# Patient Record
Sex: MALE | Race: WHITE | NOT HISPANIC OR LATINO
[De-identification: names, ages, dates, MRNs, and addresses within clinical notes are randomized per-mention and may not be internally consistent; named-entity substitution may affect disease eponyms.]

---

## 2021-10-29 ENCOUNTER — TRANSCRIPTION ENCOUNTER (OUTPATIENT)
Age: 56
End: 2021-10-29

## 2021-10-30 ENCOUNTER — TRANSCRIPTION ENCOUNTER (OUTPATIENT)
Age: 56
End: 2021-10-30

## 2021-10-30 ENCOUNTER — OUTPATIENT (OUTPATIENT)
Dept: INPATIENT UNIT | Facility: HOSPITAL | Age: 56
LOS: 1 days | End: 2021-10-30
Payer: COMMERCIAL

## 2021-10-30 ENCOUNTER — APPOINTMENT (OUTPATIENT)
Dept: DISASTER EMERGENCY | Facility: HOSPITAL | Age: 56
End: 2021-10-30

## 2021-10-30 VITALS
WEIGHT: 315 LBS | OXYGEN SATURATION: 96 % | HEIGHT: 74 IN | RESPIRATION RATE: 20 BRPM | SYSTOLIC BLOOD PRESSURE: 135 MMHG | TEMPERATURE: 99 F | HEART RATE: 57 BPM | DIASTOLIC BLOOD PRESSURE: 85 MMHG

## 2021-10-30 VITALS
OXYGEN SATURATION: 98 % | HEART RATE: 64 BPM | TEMPERATURE: 98 F | DIASTOLIC BLOOD PRESSURE: 95 MMHG | SYSTOLIC BLOOD PRESSURE: 159 MMHG | RESPIRATION RATE: 18 BRPM

## 2021-10-30 DIAGNOSIS — U07.1 COVID-19: ICD-10-CM

## 2021-10-30 PROBLEM — Z00.00 ENCOUNTER FOR PREVENTIVE HEALTH EXAMINATION: Status: ACTIVE | Noted: 2021-10-30

## 2021-10-30 PROCEDURE — M0243: CPT

## 2021-10-30 RX ORDER — SODIUM CHLORIDE 9 MG/ML
250 INJECTION INTRAMUSCULAR; INTRAVENOUS; SUBCUTANEOUS
Refills: 0 | Status: COMPLETED | OUTPATIENT
Start: 2021-10-30 | End: 2021-10-30

## 2021-10-30 RX ADMIN — SODIUM CHLORIDE 310 MILLILITER(S): 9 INJECTION INTRAMUSCULAR; INTRAVENOUS; SUBCUTANEOUS at 09:23

## 2021-10-30 NOTE — CHART NOTE - PRIOR COVID TREATMENT
history of morbid obesity.    I have reviewed the Regeneron Emergency Use Authorization (EUA) and I have provided the patient or patient's caregiver with the following information:    1.FDA has authorized emergency use Regeneron which is not an FDA-approved biological product.  2.The patient or patient's caregiver has the option to accept or refuse administration of Regeneron.  3.The significant known and potential risks and benefits of Regeneron and the extent to which such risks and benefits are unknown.  4.Information on available alternative treatments and risks and benefits of those alternatives.  5.Pt. verbalized understanding of plan and agrees with same.  6.All questions answered.

## 2021-10-31 ENCOUNTER — TRANSCRIPTION ENCOUNTER (OUTPATIENT)
Age: 56
End: 2021-10-31

## 2022-01-28 ENCOUNTER — OUTPATIENT (OUTPATIENT)
Dept: OUTPATIENT SERVICES | Facility: HOSPITAL | Age: 57
LOS: 1 days | End: 2022-01-28
Payer: COMMERCIAL

## 2022-01-28 VITALS — WEIGHT: 315 LBS | TEMPERATURE: 98 F | HEIGHT: 74 IN

## 2022-01-28 DIAGNOSIS — M25.561 PAIN IN RIGHT KNEE: ICD-10-CM

## 2022-01-28 DIAGNOSIS — Z01.818 ENCOUNTER FOR OTHER PREPROCEDURAL EXAMINATION: ICD-10-CM

## 2022-01-28 DIAGNOSIS — Z98.890 OTHER SPECIFIED POSTPROCEDURAL STATES: Chronic | ICD-10-CM

## 2022-01-28 DIAGNOSIS — S83.241A OTHER TEAR OF MEDIAL MENISCUS, CURRENT INJURY, RIGHT KNEE, INITIAL ENCOUNTER: ICD-10-CM

## 2022-01-28 LAB
ANION GAP SERPL CALC-SCNC: 3 MMOL/L — LOW (ref 5–17)
BASOPHILS # BLD AUTO: 0.04 K/UL — SIGNIFICANT CHANGE UP (ref 0–0.2)
BASOPHILS NFR BLD AUTO: 0.6 % — SIGNIFICANT CHANGE UP (ref 0–2)
BUN SERPL-MCNC: 19 MG/DL — SIGNIFICANT CHANGE UP (ref 7–23)
CALCIUM SERPL-MCNC: 9.1 MG/DL — SIGNIFICANT CHANGE UP (ref 8.5–10.1)
CHLORIDE SERPL-SCNC: 110 MMOL/L — HIGH (ref 96–108)
CO2 SERPL-SCNC: 26 MMOL/L — SIGNIFICANT CHANGE UP (ref 22–31)
CREAT SERPL-MCNC: 0.8 MG/DL — SIGNIFICANT CHANGE UP (ref 0.5–1.3)
EOSINOPHIL # BLD AUTO: 0.09 K/UL — SIGNIFICANT CHANGE UP (ref 0–0.5)
EOSINOPHIL NFR BLD AUTO: 1.2 % — SIGNIFICANT CHANGE UP (ref 0–6)
GLUCOSE SERPL-MCNC: 98 MG/DL — SIGNIFICANT CHANGE UP (ref 70–99)
HCT VFR BLD CALC: 44 % — SIGNIFICANT CHANGE UP (ref 39–50)
HGB BLD-MCNC: 14.7 G/DL — SIGNIFICANT CHANGE UP (ref 13–17)
IMM GRANULOCYTES NFR BLD AUTO: 0.4 % — SIGNIFICANT CHANGE UP (ref 0–1.5)
LYMPHOCYTES # BLD AUTO: 2.17 K/UL — SIGNIFICANT CHANGE UP (ref 1–3.3)
LYMPHOCYTES # BLD AUTO: 30.1 % — SIGNIFICANT CHANGE UP (ref 13–44)
MCHC RBC-ENTMCNC: 31.6 PG — SIGNIFICANT CHANGE UP (ref 27–34)
MCHC RBC-ENTMCNC: 33.4 GM/DL — SIGNIFICANT CHANGE UP (ref 32–36)
MCV RBC AUTO: 94.6 FL — SIGNIFICANT CHANGE UP (ref 80–100)
MONOCYTES # BLD AUTO: 0.38 K/UL — SIGNIFICANT CHANGE UP (ref 0–0.9)
MONOCYTES NFR BLD AUTO: 5.3 % — SIGNIFICANT CHANGE UP (ref 2–14)
NEUTROPHILS # BLD AUTO: 4.5 K/UL — SIGNIFICANT CHANGE UP (ref 1.8–7.4)
NEUTROPHILS NFR BLD AUTO: 62.4 % — SIGNIFICANT CHANGE UP (ref 43–77)
PLATELET # BLD AUTO: 190 K/UL — SIGNIFICANT CHANGE UP (ref 150–400)
POTASSIUM SERPL-MCNC: 4.3 MMOL/L — SIGNIFICANT CHANGE UP (ref 3.5–5.3)
POTASSIUM SERPL-SCNC: 4.3 MMOL/L — SIGNIFICANT CHANGE UP (ref 3.5–5.3)
RBC # BLD: 4.65 M/UL — SIGNIFICANT CHANGE UP (ref 4.2–5.8)
RBC # FLD: 12.8 % — SIGNIFICANT CHANGE UP (ref 10.3–14.5)
SODIUM SERPL-SCNC: 139 MMOL/L — SIGNIFICANT CHANGE UP (ref 135–145)
WBC # BLD: 7.21 K/UL — SIGNIFICANT CHANGE UP (ref 3.8–10.5)
WBC # FLD AUTO: 7.21 K/UL — SIGNIFICANT CHANGE UP (ref 3.8–10.5)

## 2022-01-28 PROCEDURE — G0463: CPT | Mod: 25

## 2022-01-28 PROCEDURE — 80048 BASIC METABOLIC PNL TOTAL CA: CPT

## 2022-01-28 PROCEDURE — 36415 COLL VENOUS BLD VENIPUNCTURE: CPT

## 2022-01-28 PROCEDURE — 85025 COMPLETE CBC W/AUTO DIFF WBC: CPT

## 2022-01-28 PROCEDURE — 93010 ELECTROCARDIOGRAM REPORT: CPT

## 2022-01-28 PROCEDURE — 93005 ELECTROCARDIOGRAM TRACING: CPT

## 2022-01-28 NOTE — H&P PST ADULT - HISTORY OF PRESENT ILLNESS
55 y/o male present to [facility] for schedule right knee arthroscopy on 2/4/22. Patient reports non radiating sharp pain to right knee associated with knee buckling s/p trip and fall 11/2021.

## 2022-01-28 NOTE — H&P PST ADULT - NSICDXFAMILYHX_GEN_ALL_CORE_FT
FAMILY HISTORY:  Father  Still living? Unknown  FH: pancreatic cancer, Age at diagnosis: Age Unknown

## 2022-01-28 NOTE — H&P PST ADULT - HEALTH CARE MAINTENANCE
no recent travels outside of the country or states within the last 14 days, no fever, URI, SOB or recent change /loss in smell or taste  no flu vaccine   covid19 hx s/s flu like only

## 2022-01-28 NOTE — H&P PST ADULT - NSANTHOSAYNRD_GEN_A_CORE
No. PARKER screening performed.  STOP BANG Legend: 0-2 = LOW Risk; 3-4 = INTERMEDIATE Risk; 5-8 = HIGH Risk

## 2022-01-28 NOTE — H&P PST ADULT - PRO ANTICIPATED DISCH DISP
Alert-The patient is alert, awake and responds to voice. The patient is oriented to time, place, and person. The triage nurse is able to obtain subjective information. home

## 2022-01-28 NOTE — H&P PST ADULT - ASSESSMENT
55 y/o male present to [facility] for schedule right knee arthroscopy on 2/4/22.

## 2022-01-28 NOTE — H&P PST ADULT - PROBLEM SELECTOR PLAN 1
Pre op and chlorhexidine instructions given and explained.  Avoid NSAIDs and OTC supplements.   Patient verbalized understanding  covid 19 swab scheduled, advised to quarantine after test

## 2022-01-29 DIAGNOSIS — Z01.818 ENCOUNTER FOR OTHER PREPROCEDURAL EXAMINATION: ICD-10-CM

## 2022-01-29 DIAGNOSIS — S83.241A OTHER TEAR OF MEDIAL MENISCUS, CURRENT INJURY, RIGHT KNEE, INITIAL ENCOUNTER: ICD-10-CM

## 2022-02-03 RX ORDER — ONDANSETRON 8 MG/1
4 TABLET, FILM COATED ORAL ONCE
Refills: 0 | Status: DISCONTINUED | OUTPATIENT
Start: 2022-02-04 | End: 2022-02-04

## 2022-02-03 RX ORDER — FENTANYL CITRATE 50 UG/ML
50 INJECTION INTRAVENOUS
Refills: 0 | Status: DISCONTINUED | OUTPATIENT
Start: 2022-02-04 | End: 2022-02-04

## 2022-02-03 RX ORDER — OXYCODONE HYDROCHLORIDE 5 MG/1
10 TABLET ORAL ONCE
Refills: 0 | Status: DISCONTINUED | OUTPATIENT
Start: 2022-02-04 | End: 2022-02-04

## 2022-02-03 RX ORDER — SODIUM CHLORIDE 9 MG/ML
1000 INJECTION, SOLUTION INTRAVENOUS
Refills: 0 | Status: DISCONTINUED | OUTPATIENT
Start: 2022-02-04 | End: 2022-02-04

## 2022-02-04 ENCOUNTER — RESULT REVIEW (OUTPATIENT)
Age: 57
End: 2022-02-04

## 2022-02-04 ENCOUNTER — OUTPATIENT (OUTPATIENT)
Dept: INPATIENT UNIT | Facility: HOSPITAL | Age: 57
LOS: 1 days | Discharge: HOME | End: 2022-02-04
Payer: COMMERCIAL

## 2022-02-04 ENCOUNTER — TRANSCRIPTION ENCOUNTER (OUTPATIENT)
Age: 57
End: 2022-02-04

## 2022-02-04 VITALS
RESPIRATION RATE: 16 BRPM | TEMPERATURE: 98 F | OXYGEN SATURATION: 99 % | DIASTOLIC BLOOD PRESSURE: 70 MMHG | SYSTOLIC BLOOD PRESSURE: 129 MMHG | HEART RATE: 68 BPM

## 2022-02-04 VITALS
WEIGHT: 315 LBS | TEMPERATURE: 97 F | RESPIRATION RATE: 16 BRPM | HEIGHT: 74 IN | OXYGEN SATURATION: 98 % | HEART RATE: 63 BPM | DIASTOLIC BLOOD PRESSURE: 74 MMHG | SYSTOLIC BLOOD PRESSURE: 135 MMHG

## 2022-02-04 DIAGNOSIS — Z98.890 OTHER SPECIFIED POSTPROCEDURAL STATES: Chronic | ICD-10-CM

## 2022-02-04 DIAGNOSIS — S83.241A OTHER TEAR OF MEDIAL MENISCUS, CURRENT INJURY, RIGHT KNEE, INITIAL ENCOUNTER: ICD-10-CM

## 2022-02-04 PROCEDURE — C9399: CPT

## 2022-02-04 PROCEDURE — 88304 TISSUE EXAM BY PATHOLOGIST: CPT | Mod: 26

## 2022-02-04 PROCEDURE — 88304 TISSUE EXAM BY PATHOLOGIST: CPT

## 2022-02-04 RX ORDER — TRAMADOL HYDROCHLORIDE 50 MG/1
1 TABLET ORAL
Qty: 0 | Refills: 0 | DISCHARGE

## 2022-02-04 RX ORDER — ACETAMINOPHEN 500 MG
2 TABLET ORAL
Qty: 0 | Refills: 0 | DISCHARGE

## 2022-02-04 NOTE — ASU DISCHARGE PLAN (ADULT/PEDIATRIC) - CARE PROVIDER_API CALL
Orlin Miller)  Orthopaedic Surgery; Sports Medicine  [address] [place] [address]  Phone: [phone]  Fax: [phone]  Follow Up Time:

## 2022-02-04 NOTE — ASU DISCHARGE PLAN (ADULT/PEDIATRIC) - NS MD DC FALL RISK RISK
For information on Fall & Injury Prevention, visit: [email or web address] OR  [email or web address] OR  https://www.cdc.gov/steadi/patient.html

## 2022-02-04 NOTE — ASU PATIENT PROFILE, ADULT - FALL HARM RISK - UNIVERSAL INTERVENTIONS
Bed in lowest position, wheels locked, appropriate side rails in place/Call bell, personal items and telephone in reach/Instruct patient to call for assistance before getting out of bed or chair/Non-slip footwear when patient is out of bed/[place] to call system/Physically safe environment - no spills, clutter or unnecessary equipment/Purposeful Proactive Rounding/Room/bathroom lighting operational, light cord in reach

## 2022-02-04 NOTE — ASU DISCHARGE PLAN (ADULT/PEDIATRIC) - NURSING INSTRUCTIONS
Refer to the multicolored fact sheet for any problems you experience. If you have difficulty urinating or unable to urinate in 8 hours after surgery, call your Dr., or return to  emergency room.  Notify your Dr. if your fever is 101 or greater. If the pain medicine your  recsimónnds does not help you, or you have severe pain call your Dr.. If you cannot reach the doctor, call [facility] Emergency Department at [phone] or go to your local Emergency Department. A responsible adult should be with you for the rest of the day and night for your safety, and to help you. Apply ice to affected area 20min on and 20min off for the  first 24-48 hours. Do not apply directly to skin, place barrier between ice and skin.  Resume your medications as listed on the attached Medication Record.

## 2022-02-04 NOTE — ASU DISCHARGE PLAN (ADULT/PEDIATRIC) - ASU DC SPECIAL INSTRUCTIONSFT
Please follow preprinted instructions provided by your surgeon.  Please begin taking Aspirin postoperative day one.

## 2022-02-09 DIAGNOSIS — E66.01 MORBID (SEVERE) OBESITY DUE TO EXCESS CALORIES: ICD-10-CM

## 2022-02-09 DIAGNOSIS — Z87.891 PERSONAL HISTORY OF NICOTINE DEPENDENCE: ICD-10-CM

## 2022-02-09 DIAGNOSIS — M23.221 DERANGEMENT OF POSTERIOR HORN OF MEDIAL MENISCUS DUE TO OLD TEAR OR INJURY, RIGHT KNEE: ICD-10-CM

## 2022-02-09 DIAGNOSIS — M25.561 PAIN IN RIGHT KNEE: ICD-10-CM

## 2022-02-09 DIAGNOSIS — Z80.0 FAMILY HISTORY OF MALIGNANT NEOPLASM OF DIGESTIVE ORGANS: ICD-10-CM

## 2022-02-09 DIAGNOSIS — Z79.891 LONG TERM (CURRENT) USE OF OPIATE ANALGESIC: ICD-10-CM

## 2022-03-31 PROBLEM — M25.561 PAIN IN RIGHT KNEE: Chronic | Status: ACTIVE | Noted: 2022-01-28

## 2022-04-28 ENCOUNTER — APPOINTMENT (OUTPATIENT)
Dept: ORTHOPEDIC SURGERY | Facility: CLINIC | Age: 57
End: 2022-04-28
Payer: OTHER MISCELLANEOUS

## 2022-04-28 VITALS — BODY MASS INDEX: 40.43 KG/M2 | HEIGHT: 74 IN | WEIGHT: 315 LBS

## 2022-04-28 DIAGNOSIS — Z95.5 PRESENCE OF CORONARY ANGIOPLASTY IMPLANT AND GRAFT: ICD-10-CM

## 2022-04-28 DIAGNOSIS — Z78.9 OTHER SPECIFIED HEALTH STATUS: ICD-10-CM

## 2022-04-28 DIAGNOSIS — Z87.891 PERSONAL HISTORY OF NICOTINE DEPENDENCE: ICD-10-CM

## 2022-04-28 PROCEDURE — 99024 POSTOP FOLLOW-UP VISIT: CPT

## 2022-05-01 ENCOUNTER — FORM ENCOUNTER (OUTPATIENT)
Age: 57
End: 2022-05-01

## 2022-05-01 PROBLEM — Z87.891 FORMER SMOKER: Status: ACTIVE | Noted: 2022-04-28

## 2022-05-01 PROBLEM — Z95.5 HISTORY OF HEART ARTERY STENT: Status: RESOLVED | Noted: 2022-04-28 | Resolved: 2022-05-01

## 2022-05-01 NOTE — IMAGING
[de-identified] : The patient is a well appearing 56 year old male of their stated age. \par Patient ambulates with a normal gait. \par Negative straight leg raise bilateral \par \par Surgical site: right knee\par \par Incision sites: Well healed\par \par Range of motion: 0-130\par \par Motor Testin/5 quad\par \par Swelling/Effusion: None\par \par Tenderness to palpation: None\par \par Right Calf: soft and nontender\par Left Calf: soft and nontender\par \par Neurovascular Examination: Grossly intact, 2+ distal pulses\par \par \par Assessment & Plan: The patient is approximately 3 months s/p Right knee examination under anesthesia, arthroscopic partial medial meniscectomy with interval improvement (22).The patient is instructed in wound management. The patient's post-op plan, protocol and activity modifications have been thoroughly discussed and the patient expressed understanding. Patient will continue physical therapy. Patient will follow up in 6 weeks for an interval recheck. Patient will c/t to follow up with their cardiologist for work clearance. \par \par The patient's current medication management of their orthopedic diagnosis was reviewed today:\par (1) We discussed a comprehensive treatment plan that included possible pharmaceutical management involving the use of prescription strength medications including but not limited to options such as oral Naprosyn 500mg BID, once daily Meloxicam 15 mg, or 500-650 mg Tylenol versus over the counter oral medications and topical prescription NSAID Pennsaid vs over the counter Voltaren gel.\par (2) There is a moderate risk of morbidity with further treatment, especially from use of prescription strength medications and possible side effects of these medications which include upset stomach with oral medications, skin reactions to topical medications and cardiac/renal issues with long term use.\par (3) I recommended that the patient follow-up with their medical physician to discuss any significant specific potential issues with long term medication use such as interactions with current medications or with exacerbation of underlying medical comorbidities.\par (4) The benefits and risks associated with use of injectable, oral or topical, prescription and over the counter anti-inflammatory medications were discussed with the patient. The patient voiced understanding of the risks including but not limited to bleeding, stroke, kidney dysfunction, heart disease, and were referred to the black box warning label for further information.\par \par I, Yoni Ayoub, attest that this documentation has been prepared under the direction and in the presence of Provider Dr. Miller\par \par The documentation recorded by the scribe accurately reflects the service I personally performed and the decisions made by me.\par

## 2022-05-01 NOTE — HISTORY OF PRESENT ILLNESS
[de-identified] : The patient is s/p right partial medial meniscectomy. \par Date of Surgery: 2/4/22\par Pain: At Rest: 0/10 \par With Activity: 6/10 \par Mechanism of injury: Patient states he tripped over a wire at work and injured his right knee.\par This is a Work Related Injury being treated under Worker's Compensation.\par This is NOT an athletic injury occurring associated with an interscholastic or organized sports team.\par Treatment/Imaging/Studies Since Last Visit: Had to stop PT due to needing a stent in his heart\par 	Reports Available For Review Today: None\par Out of work/sport: Not working since 1/31\par School/Sport/Position/Occupation: \par Change since last visit: Feels continued soreness, had to get sx to put a stent in his heart. \par Additional Information: None

## 2022-06-07 ENCOUNTER — APPOINTMENT (OUTPATIENT)
Dept: ORTHOPEDIC SURGERY | Facility: CLINIC | Age: 57
End: 2022-06-07
Payer: OTHER MISCELLANEOUS

## 2022-06-07 VITALS — BODY MASS INDEX: 40.43 KG/M2 | HEIGHT: 74 IN | WEIGHT: 315 LBS

## 2022-06-07 PROCEDURE — J3490M: CUSTOM

## 2022-06-07 PROCEDURE — 99214 OFFICE O/P EST MOD 30 MIN: CPT | Mod: 25

## 2022-06-07 PROCEDURE — 99072 ADDL SUPL MATRL&STAF TM PHE: CPT

## 2022-06-07 PROCEDURE — 20611 DRAIN/INJ JOINT/BURSA W/US: CPT | Mod: RT

## 2022-06-07 NOTE — HISTORY OF PRESENT ILLNESS
[de-identified] : The patient a 57 year old right hand dominant male who presents today for right knee follow up\par Date of Injury: 11/30/21  Date of Surgery: 2/4/22\par Pain: At Rest: 4/10 \par With Activity: 8/10 \par Mechanism of injury: Patient states he tripped over a wire at work and injured his right knee.\par This is a Work Related Injury being treated under Worker's Compensation.\par This is NOT an athletic injury occurring associated with an interscholastic or organized sports team.\par Quality of symptoms: sharp pain occasionally, constant dull ache, TTP medially, difficultly sleeping\par Improves with: rest, use of pillow between knees at night to sleep\par Worse with: sleeping on side, change in direction when walking\par Treatment/Imaging/Studies Since Last Visit: Had to stop PT due to needing a stent in his heart\par 	Reports Available For Review Today: None\par Out of work/sport: currently working full duty\par School/Sport/Position/Occupation: \par Change since last visit: Pt was feeling better but then had a setback and increased pain\par Additional Information: is s/p right partial medial meniscectomy

## 2022-06-07 NOTE — IMAGING
[de-identified] : The patient is a well appearing 57 year old male of their stated age. \par Patient ambulates with a normal gait. \par Negative straight leg raise bilateral \par \par Surgical site: right knee\par \par Incision sites: Well healed\par \par Range of motion: 0-130\par \par Motor Testin-/5 quad\par \par Swelling/Effusion: MJLT, tender medial femoral condyle\par \par Tenderness to palpation: Trace to mild effusion\par \par Right Calf: soft and nontender\par Left Calf: soft and nontender\par \par Neurovascular Examination: Grossly intact, 2+ distal pulses\par \par \par Assessment & Plan: The patient is approximately 4 months s/p right knee examination under anesthesia, arthroscopic partial medial meniscectomy with interval improvement (22). The patient's post-op plan, protocol and activity modifications have been thoroughly discussed and the patient expressed understanding. They have post traumatic arthritic changes s/p work related injury. Discussed CSI therapy. Patient elected to receive right knee  CSI today. Patient will continue physical therapy. Will be requesting authorization for visco injections today. F/u 4wks. \par \par Procedure Note: Musculoskeletal Injection\par  \par Diagnosis: Post traumatic right knee OA s/p PMM\par  \par Procedure: Right knee, superolateral,  CSI\par  \par Indication:  The patient has had persistent pain despite conservative treatment.  Risks, benefits and alternatives to procedure were discussed; all questions were answered to the patient's apparent satisfaction and informed consent obtained.  Consent form was signed and dated today.  The patient denied prior problems with local anesthetics, injectable cortisones, chicken allergy, coagulopathy and no relevant drug or preservative allergies or sensitivities.\par  \par The area of injection was prepared in a sterile fashion.  Prior to injection a 'Time Out' was conducted in accordance with University policy and the site and nature of procedure verified with the patient.\par  \par Procedure:\par  \par The injection was carried out utilizing sterile technique from a superolateral arthroscopic portal position\par \par 0cc of clear synovial fluid was aspirated.\par The specimen:\par (X) appeared benign and was discarded\par ( ) was sent for Culture / Cell Count / Crystal analysis / [_].\par \par Injection into the target area with care taken to aspirate frequently to minimize the risk of intravascular injection was performed with:\par  \par ( ) 1cc of Depomedrol (80mg/ml)\par (X) 1cc of Dexamethasone (10mg/ml)\par ( ) 1cc of Toradol (30mg/ml)\par (X) 9cc of 0.5% Bupivacaine\par ( ) 1cc of 1% Lidocaine\par ( ) 5cc of 32mg Zilretta, prepared and diluted per  instructions\par ( ) 2 cc of Hylan G-F 20 (Synvisc) 16mg/2ml\par ( ) 6 cc of Hylan G-F 20 (SynvisoOne) 16mg/2ml\par ( ) 2cc of Euflexxa\par ( ) 2cc of Orthovisc\par ( ) 2cc of GelOne\par ( ) 3cc of Durolane (20mg/ml)\par  \par Patient tolerated the procedure well and direct pressure was applied for hemostasis. The patient was reminded of potential post-injection risks including, but not limited to, delayed hypersensitivity reactions and/or infection.  The patient verified that they had the office and the Emergency Room's contact information if any problems should arise.  After several minutes, the patient informed me that they felt fine and was released from the office.\par \par \par The patient's current medication management of their orthopedic diagnosis was reviewed today:\par (1) We discussed a comprehensive treatment plan that included possible pharmaceutical management involving the use of prescription strength medications including but not limited to options such as oral Naprosyn 500mg BID, once daily Meloxicam 15 mg, or 500-650 mg Tylenol versus over the counter oral medications and topical prescription NSAID Pennsaid vs over the counter Voltaren gel.\par (2) There is a moderate risk of morbidity with further treatment, especially from use of prescription strength medications and possible side effects of these medications which include upset stomach with oral medications, skin reactions to topical medications and cardiac/renal issues with long term use.\par (3) I recommended that the patient follow-up with their medical physician to discuss any significant specific potential issues with long term medication use such as interactions with current medications or with exacerbation of underlying medical comorbidities.\par (4) The benefits and risks associated with use of injectable, oral or topical, prescription and over the counter anti-inflammatory medications were discussed with the patient. The patient voiced understanding of the risks including but not limited to bleeding, stroke, kidney dysfunction, heart disease, and were referred to the black box warning label for further information.\par \par I, Yoni Ayoub, attest that this documentation has been prepared under the direction and in the presence of Provider Dr. Miller\par \par The documentation recorded by the scribe accurately reflects the service I personally performed and the decisions made by me.\par The patient was seen by me under the direct supervision of Dr. Orlin Miller\par \par

## 2022-06-09 ENCOUNTER — APPOINTMENT (OUTPATIENT)
Dept: ORTHOPEDIC SURGERY | Facility: CLINIC | Age: 57
End: 2022-06-09

## 2022-06-27 ENCOUNTER — APPOINTMENT (OUTPATIENT)
Dept: ORTHOPEDIC SURGERY | Facility: CLINIC | Age: 57
End: 2022-06-27

## 2022-06-27 VITALS — WEIGHT: 315 LBS | HEIGHT: 74 IN | BODY MASS INDEX: 40.43 KG/M2

## 2022-06-27 PROCEDURE — 99214 OFFICE O/P EST MOD 30 MIN: CPT | Mod: 25

## 2022-06-27 PROCEDURE — 99072 ADDL SUPL MATRL&STAF TM PHE: CPT

## 2022-06-27 PROCEDURE — 20611 DRAIN/INJ JOINT/BURSA W/US: CPT | Mod: RT

## 2022-06-28 NOTE — IMAGING
[de-identified] : The patient is a well appearing 57 year old male of their stated age. \par Patient ambulates with a normal gait. \par Negative straight leg raise bilateral \par \par Surgical site: right knee\par \par Incision sites: Well healed\par \par Range of motion: 0-130\par \par Motor Testin-/5 quad\par \par Swelling/Effusion: MJLT, tender medial femoral condyle\par \par Tenderness to palpation: Trace to mild effusion\par \par Right Calf: soft and nontender\par Left Calf: soft and nontender\par \par Neurovascular Examination: Grossly intact, 2+ distal pulses\par \par \par Assessment & Plan: The patient is approximately 5 months s/p right knee examination under anesthesia, arthroscopic partial medial meniscectomy with interval improvement (22). The patient's post-op plan, protocol and activity modifications have been thoroughly discussed and the patient expressed understanding. They have post traumatic arthritic changes s/p work related injury.  Patient will continue physical therapy. They received a right knee Gel One injection today. F/u 6 months.\par \par Procedure Note: Musculoskeletal Injection\par  \par Diagnosis: Right knee OA and s/p right knee arthroscopy\par  \par Procedure: Right knee, superolateral, Gel One\par  \par Indication:  The patient has had persistent pain despite conservative treatment.  Risks, benefits and alternatives to procedure were discussed; all questions were answered to the patient's apparent satisfaction and informed consent obtained.  Consent form was signed and dated today.  The patient denied prior problems with local anesthetics, injectable cortisones, chicken allergy, coagulopathy and no relevant drug or preservative allergies or sensitivities.\par  \par The area of injection was prepared in a sterile fashion. Prior to injection a 'Time Out' was conducted in accordance with University policy and the site and nature of procedure verified with the patient.\par  \par Procedure:\par  \par The injection and aspiration was carried out utilizing sterile technique from a superolateral arthroscopic portal position with needle placement under ultrasound guidance to improve accuracy and minimize risk to the patient and: \par  \par (X) Diagnositic ultrasound in the long and short axis revealed right knee OA\par \par 0cc of clear synovial fluid was aspirated.\par The specimen:\par (X) appeared benign and was discarded\par ( ) was sent for Culture / Cell Count / Crystal analysis / [_].\par  \par Injection into the target area with care taken to aspirate frequently to minimize the risk of intravascular injection was performed with:\par  \par ( ) 1cc of Depomedrol (80mg/ml)\par ( ) 1cc of Dexamethasone (10mg/ml)\par ( ) 1cc of Toradol (30mg/ml)\par ( ) 9cc of 0.5% Bupivacaine\par (x) 5cc of 1% Lidocaine\par ( ) 5cc of 32mg Zilretta, prepared and diluted per  instructions\par ( ) 2 cc of Hylan G-F 20 (Synvisc) 16mg/2ml\par ( ) 6 cc of Hylan G-F 20 (SynvisoOne) 16mg/2ml\par ( ) 2cc of Euflexxa\par ( ) 2cc of Orthovisc\par (X) 2cc of GelOne\par ( ) 3cc of Durolane (20mg/ml)\par  \par Patient tolerated the procedure well and direct pressure was applied for hemostasis. The patient was reminded of potential post-injection risks including, but not limited to, delayed hypersensitivity reactions and/or infection.  The patient verified that they had the office and the Emergency Room's contact information if any problems should arise.  After several minutes, the patient informed me that they felt fine and was released from the office.\par \par \par The patient's current medication management of their orthopedic diagnosis was reviewed today:\par (1) We discussed a comprehensive treatment plan that included possible pharmaceutical management involving the use of prescription strength medications including but not limited to options such as oral Naprosyn 500mg BID, once daily Meloxicam 15 mg, or 500-650 mg Tylenol versus over the counter oral medications and topical prescription NSAID Pennsaid vs over the counter Voltaren gel.\par (2) There is a moderate risk of morbidity with further treatment, especially from use of prescription strength medications and possible side effects of these medications which include upset stomach with oral medications, skin reactions to topical medications and cardiac/renal issues with long term use.\par (3) I recommended that the patient follow-up with their medical physician to discuss any significant specific potential issues with long term medication use such as interactions with current medications or with exacerbation of underlying medical comorbidities.\par (4) The benefits and risks associated with use of injectable, oral or topical, prescription and over the counter anti-inflammatory medications were discussed with the patient. The patient voiced understanding of the risks including but not limited to bleeding, stroke, kidney dysfunction, heart disease, and were referred to the black box warning label for further information.\par \par I, Yoni Ayoub, attest that this documentation has been prepared under the direction and in the presence of Provider Dr. Miller\par \par The documentation recorded by the scribe accurately reflects the service I personally performed and the decisions made by me.\par The patient was seen by me under the direct supervision of Dr. Orlin Miller\par \par

## 2022-06-28 NOTE — HISTORY OF PRESENT ILLNESS
[de-identified] : The patient a 57 year old right hand dominant male who presents today for right knee follow up and possible injection therapy\par Date of Injury: 11/30/21  Date of Surgery: 2/4/22\par Pain: At Rest: 5/10 \par With Activity: 8/10 \par Mechanism of injury: Patient states he tripped over a wire at work and injured his right knee.\par This is a Work Related Injury being treated under Worker's Compensation.\par This is NOT an athletic injury occurring associated with an interscholastic or organized sports team.\par Quality of symptoms: sharp pain occasionally, constant dull ache, TTP medially, difficultly sleeping\par Improves with: rest, use of pillow between knees at night to sleep\par Worse with: sleeping on side, change in direction when walking\par Treatment/Imaging/Studies Since Last Visit: CSI\par 	Reports Available For Review Today: None\par Out of work/sport: currently working full duty\par School/Sport/Position/Occupation: \par Change since last visit: CSI did not help relieve pain. \par Additional Information: is s/p right partial medial meniscectomy

## 2022-07-11 ENCOUNTER — APPOINTMENT (OUTPATIENT)
Dept: ORTHOPEDIC SURGERY | Facility: CLINIC | Age: 57
End: 2022-07-11

## 2022-07-19 ENCOUNTER — RX RENEWAL (OUTPATIENT)
Age: 57
End: 2022-07-19

## 2022-10-14 ENCOUNTER — NON-APPOINTMENT (OUTPATIENT)
Age: 57
End: 2022-10-14

## 2022-10-14 ENCOUNTER — APPOINTMENT (OUTPATIENT)
Dept: PULMONOLOGY | Facility: CLINIC | Age: 57
End: 2022-10-14

## 2022-10-14 VITALS
SYSTOLIC BLOOD PRESSURE: 142 MMHG | BODY MASS INDEX: 40.43 KG/M2 | WEIGHT: 315 LBS | TEMPERATURE: 96.8 F | OXYGEN SATURATION: 96 % | HEIGHT: 74 IN | DIASTOLIC BLOOD PRESSURE: 56 MMHG | HEART RATE: 43 BPM

## 2022-10-14 DIAGNOSIS — Z97.4 PRESENCE OF EXTERNAL HEARING-AID: ICD-10-CM

## 2022-10-14 DIAGNOSIS — E78.5 HYPERLIPIDEMIA, UNSPECIFIED: ICD-10-CM

## 2022-10-14 DIAGNOSIS — I10 ESSENTIAL (PRIMARY) HYPERTENSION: ICD-10-CM

## 2022-10-14 PROCEDURE — 94010 BREATHING CAPACITY TEST: CPT

## 2022-10-14 PROCEDURE — 99204 OFFICE O/P NEW MOD 45 MIN: CPT | Mod: 25

## 2022-10-14 RX ORDER — CLOPIDOGREL BISULFATE 75 MG/1
75 TABLET, FILM COATED ORAL
Refills: 0 | Status: ACTIVE | COMMUNITY
Start: 2022-10-14

## 2022-10-14 RX ORDER — METOPROLOL SUCCINATE 25 MG/1
25 TABLET, EXTENDED RELEASE ORAL
Refills: 0 | Status: ACTIVE | COMMUNITY
Start: 2022-10-14

## 2022-10-14 RX ORDER — ASPIRIN ENTERIC COATED TABLETS 81 MG 81 MG/1
81 TABLET, DELAYED RELEASE ORAL
Refills: 0 | Status: ACTIVE | COMMUNITY
Start: 2022-10-14

## 2022-10-14 RX ORDER — ISOSORBIDE MONONITRATE 30 MG/1
30 TABLET, EXTENDED RELEASE ORAL
Refills: 0 | Status: ACTIVE | COMMUNITY
Start: 2022-10-14

## 2022-10-14 RX ORDER — ATORVASTATIN CALCIUM 80 MG/1
80 TABLET, FILM COATED ORAL
Refills: 0 | Status: ACTIVE | COMMUNITY
Start: 2022-10-14

## 2022-11-09 ENCOUNTER — APPOINTMENT (OUTPATIENT)
Dept: PULMONOLOGY | Facility: CLINIC | Age: 57
End: 2022-11-09

## 2022-11-09 VITALS
OXYGEN SATURATION: 96 % | HEART RATE: 62 BPM | HEIGHT: 74 IN | BODY MASS INDEX: 40.43 KG/M2 | WEIGHT: 315 LBS | DIASTOLIC BLOOD PRESSURE: 80 MMHG | SYSTOLIC BLOOD PRESSURE: 120 MMHG | TEMPERATURE: 97.4 F

## 2022-11-09 PROCEDURE — 99214 OFFICE O/P EST MOD 30 MIN: CPT

## 2022-11-09 RX ORDER — ESCITALOPRAM OXALATE 10 MG/1
10 TABLET ORAL
Qty: 30 | Refills: 0 | Status: ACTIVE | COMMUNITY
Start: 2022-10-21

## 2022-11-09 NOTE — HISTORY OF PRESENT ILLNESS
[Former] : former [Never] : never [Current] : current [TextBox_4] : 10/14/2022 Orlin Vargas is a 57-year-old male with  recent cardiac stenting The is now SOB He is known to have been a smoker but quit 36yrs ago. He is morbidly obese and has Sx of sleep apnea . His wife observes him snoring and apneic   The patient was sent for a Ct scan and has findings of " air trapping, mosaic pattern, and atelectasis of the RML"  on weight watchers \par \par 9/20/2022 Orlin Vargas is a 57-year-old male with sleep study results indicating he has moderate sleep apnea.  Patient is here to to discuss further treatment of the sleep apnea patient does not report any new events he continues to try to lose weight on weight watchers. [TextBox_13] : 22 [YearQuit] : 1986 [TextBox_27] : started a few months ago only a few occasions since

## 2022-11-09 NOTE — REVIEW OF SYSTEMS
[Cough] : no cough [Sputum] : no sputum [Wheezing] : no wheezing [SOB on Exertion] : sob on exertion [Seasonal Allergies] : no seasonal allergies [GERD] : no gerd [Diarrhea] : no diarrhea [Constipation] : no constipation [Dysphagia] : no dysphagia [Nocturia] : nocturia [Arthralgias] : arthralgias [Back Pain] : back pain [Headache] : no headache [Dizziness] : no dizziness [Numbness] : no numbness [Memory Loss] : no memory loss [Tremor] : no tremor [Anxiety] : anxiety [Diabetes] : no diabetes [Thyroid Problem] : no thyroid problem [Obesity] : obesity [Negative] : Dermatologic [TextBox_3] : Morbidly obese

## 2022-11-09 NOTE — PHYSICAL EXAM
[No Acute Distress] : no acute distress [Normal Appearance] : normal appearance [Normal Rate/Rhythm] : normal rate/rhythm [Normal S1, S2] : normal s1, s2 [No Resp Distress] : no resp distress [Clear to Auscultation Bilaterally] : clear to auscultation bilaterally [Benign] : benign [No Clubbing] : no clubbing [No Cyanosis] : no cyanosis [No Edema] : no edema [FROM] : FROM [Normal Color/ Pigmentation] : normal color/ pigmentation

## 2022-11-09 NOTE — ASSESSMENT
[FreeTextEntry1] : 11/9/2022 Mr. Vargas is a 57-year-old white male with symptoms of sleep apnea loud snoring, daytime somnolence, hypertension and lack of satisfying sleep.  The patient had sleep study that revealed moderate sleep apnea with 23 AHI.  More than two thirds the events were apneas.  The patient was told about CPAP/APAP, dental appliance, weight loss, and inspire implantable device.  The patient is anxious to start treatment and will start with the APAP and investigate getting a dental appliance.  A CAT scan of the chest will have to be obtained in approximately 6 months to year time spent 30 minutes counseling, education, documentation, imaging reviewed, medication reviewed, old records reviewed, HX and PE

## 2022-11-09 NOTE — REASON FOR VISIT
[Follow-Up] : a follow-up visit [Cough] : cough [Shortness of Breath] : shortness of breath [TextBox_44] : 1 month, Patient had sleep study on 11/02/22,dyspnea on exertion

## 2022-11-29 NOTE — HISTORY OF PRESENT ILLNESS
[Former] : former [Never] : never [Current] : current [TextBox_4] : 10/14/2022 Orlin Vargas is a 57-year-old male with  recent cardiac stenting The is now SOB He is known to have been a smoker but quit 36yrs ago. He is morbidly obese and has Sx of sleep apnea . His wife observes him snoring and apneic   The patient was sent for a Ct scan and has findings of " air trapping, mosaic pattern, and atelectasis of the RML"  on weight watchers  [TextBox_13] : 22 [YearQuit] : 1986 [TextBox_27] : started a few months ago only a few occasions since

## 2022-11-29 NOTE — ASSESSMENT
[FreeTextEntry1] : 10/14/2022 Orlin Vargas is a 57-year-old white male with recent cardiac event resulting in cardiac stenting.  This was a sentinel event in his life and he is reassessing how to improve his health.  The patient realizes that he is morbidly obese and seeks to lose weight.  Exercise and regain some youthful vitality.  At this time the patient is succeeding to lose weight on his diet and now is here to evaluate his breathing.  The patient had a spirometry which was normal and his CT scan showed some air trapping with a mosaic pattern that is most likely consistent with some chronic lung disease.  Most prominently the patient has symptoms of sleep apnea.  Has been advised for him to have a sleep study and then revisit treatment most likely with APAP. All qestions form the patient and his wife were answered    time spent 45mnutes  counseling, education, documentation, imaging reviewed, medication reviewed, , old records reviewed, HX and PE

## 2022-11-29 NOTE — REASON FOR VISIT
[Initial] : an initial visit [Shortness of Breath] : shortness of breath [Spouse] : spouse [TextBox_44] : Patient had difficulty breathing he was found to need cardiac cath and difficulty breathing resolved until a few weeks ago about 2-3. He was evaluated by cardiology who suggested follow up with pulmonary. Chest CT was ordered by PCP. Spouse states she has witnessed patient stop breathing in his sleep. Covid tested verified on [facility] 10/10/22

## 2022-11-29 NOTE — REVIEW OF SYSTEMS
[Postnasal Drip] : no postnasal drip [SOB on Exertion] : sob on exertion [Seasonal Allergies] : no seasonal allergies [Arthralgias] : arthralgias [Back Pain] : back pain [Anxiety] : anxiety [Diabetes] : no diabetes [Thyroid Problem] : no thyroid problem [Obesity] : obesity [Negative] : Endocrine [TextBox_3] : Bmi 48

## 2022-12-22 ENCOUNTER — APPOINTMENT (OUTPATIENT)
Dept: ORTHOPEDIC SURGERY | Facility: CLINIC | Age: 57
End: 2022-12-22

## 2023-02-16 ENCOUNTER — APPOINTMENT (OUTPATIENT)
Dept: ORTHOPEDIC SURGERY | Facility: CLINIC | Age: 58
End: 2023-02-16

## 2023-03-16 ENCOUNTER — APPOINTMENT (OUTPATIENT)
Dept: ORTHOPEDIC SURGERY | Facility: CLINIC | Age: 58
End: 2023-03-16
Payer: OTHER MISCELLANEOUS

## 2023-03-16 VITALS — BODY MASS INDEX: 40.43 KG/M2 | HEIGHT: 74 IN | WEIGHT: 315 LBS

## 2023-03-16 DIAGNOSIS — M17.31 UNILATERAL POST-TRAUMATIC OSTEOARTHRITIS, RIGHT KNEE: ICD-10-CM

## 2023-03-16 DIAGNOSIS — S83.231A COMPLEX TEAR OF MEDIAL MENISCUS, CURRENT INJURY, RIGHT KNEE, INITIAL ENCOUNTER: ICD-10-CM

## 2023-03-16 PROCEDURE — 99072 ADDL SUPL MATRL&STAF TM PHE: CPT

## 2023-03-16 PROCEDURE — 99455 WORK RELATED DISABILITY EXAM: CPT

## 2023-03-16 RX ORDER — IBUPROFEN 800 MG/1
800 TABLET, FILM COATED ORAL
Qty: 60 | Refills: 0 | Status: ACTIVE | COMMUNITY
Start: 2022-07-19 | End: 1900-01-01

## 2023-03-17 NOTE — HISTORY OF PRESENT ILLNESS
[de-identified] : The patient a 57 year old right hand dominant male who presents today for right knee follow up and possible injection therapy\par Date of Injury: 11/30/21  Date of Surgery: 2/4/22\par Pain: At Rest: 4/10 \par With Activity: 6/10 \par Mechanism of injury: Patient states he tripped over a wire at work and injured his right knee.\par This is a Work Related Injury being treated under Worker's Compensation.\par This is NOT an athletic injury occurring associated with an interscholastic or organized sports team.\par Quality of symptoms: sharp pain occasionally, constant dull ache, difficultly sleeping\par Improves with: rest, use of pillow between knees at night to sleep\par Worse with: sleeping on side, change in direction when walking\par Treatment/Imaging/Studies Since Last Visit: none\par 	Reports Available For Review Today: None\par Out of work/sport: currently working full duty\par School/Sport/Position/Occupation: \par Change since last visit: Patient states he has no significant change in symptoms since last visit.\par Additional Information: is s/p right partial medial meniscectomy

## 2023-03-17 NOTE — WORK
[Has the patient reached Maximum Medical Improvement? If yes, indicate date___] : Yes, on [unfilled] [Is there permanent partial impairment?] : Yes [FreeTextEntry6] : Right knee [Right] : right [No ___________] : No: [unfilled] [FreeTextEntry7] : Knee [FreeTextEntry8] : 3120 [de-identified] : Chondromalacia, quad weakness, loss of motion [FreeTextEntry5] : 25 [At the pre-injury job] : At the pre-injury job [___ lbs] : Frequently: [unfilled] lbs [] : Constantly [N/A] : N/A [Medium Work] : Medium work [Has the patient had an injury/illness since the date of injury which impacts residual functional capacity?] : No [Would the patient benefit from vocational rehabilitation? If Yes, explain below.] :  No

## 2023-03-17 NOTE — IMAGING
[de-identified] : The patient is a well appearing 58 year old male of their stated age. \par Patient ambulates with a normal gait. \par Negative straight leg raise bilateral \par \par Surgical site: Right knee\par \par Incision sites: Well healed\par \par Range of motion measured 3x with goniometer active and passive: 3-120\par \par Motor Testin-/5 quad\par \par Swelling/Effusion: MJLT\par \par Tenderness to palpation: None \par \par Right Calf: soft and nontender\par Left Calf: soft and nontender\par \par Neurovascular Examination: Grossly intact, 2+ distal pulses\par \par \par Assessment & Plan: The patient is approximately 13 months s/p right knee examination under anesthesia, arthroscopic partial medial meniscectomy with interval improvement (DOS: 22). The patient's post-op plan, protocol and activity modifications have been thoroughly discussed and the patient expressed understanding. They have post traumatic arthritic changes s/p work related injury.  Advised to continue with HEP and stretching. Modify activity as discussed. Follow up on a PRN basis unless new symptoms arise. \par \par The patient has reached maximum medical benefit and based on the [facility] 2018 worker's compensation guidelines, schedule loss of use of the right knee is 25% inclusive of any prior loss of use assessment.  It is within a reasonable degree of medical certainty that the injury is causally related to the work related injury on the date noted above.\par \par \par \par \par The patient's current medication management of their orthopedic diagnosis was reviewed today:\par (1) We discussed a comprehensive treatment plan that included possible pharmaceutical management involving the use of prescription strength medications including but not limited to options such as oral Naprosyn 500mg BID, once daily Meloxicam 15 mg, or 500-650 mg Tylenol versus over the counter oral medications and topical prescription NSAID Pennsaid vs over the counter Voltaren gel.  Based on our extensive discussion, the patient declined prescription medication and will use over the counter Advil, Alleve, Voltaren Gel or Tylenol as directed.\par (2) There is a moderate risk of morbidity with further treatment, especially from use of prescription strength medications and possible side effects of these medications which include upset stomach with oral medications, skin reactions to topical medications and cardiac/renal issues with long term use.\par (3) I recommended that the patient follow-up with their medical physician to discuss any significant specific potential issues with long term medication use such as interactions with current medications or with exacerbation of underlying medical comorbidities.\par (4) The benefits and risks associated with use of injectable, oral or topical, prescription and over the counter anti-inflammatory medications were discussed with the patient. The patient voiced understanding of the risks including but not limited to bleeding, stroke, kidney dysfunction, heart disease, and were referred to the black box warning label for further information. \par \par Estefany HAYES attest that this documentation has been prepared under the direction and in the presence of Provider Dr. Orlin Miller. \par \par The documentation recorded by the scribe accurately reflects the services I, Dr. Orlin Miller, personally performed and the decisions made by me.\par \par

## 2023-04-03 ENCOUNTER — NON-APPOINTMENT (OUTPATIENT)
Age: 58
End: 2023-04-03

## 2023-04-03 ENCOUNTER — APPOINTMENT (OUTPATIENT)
Dept: PULMONOLOGY | Facility: CLINIC | Age: 58
End: 2023-04-03
Payer: COMMERCIAL

## 2023-04-03 VITALS
TEMPERATURE: 98.4 F | WEIGHT: 315 LBS | DIASTOLIC BLOOD PRESSURE: 82 MMHG | HEIGHT: 74 IN | SYSTOLIC BLOOD PRESSURE: 122 MMHG | BODY MASS INDEX: 40.43 KG/M2 | HEART RATE: 75 BPM | OXYGEN SATURATION: 96 %

## 2023-04-03 DIAGNOSIS — G47.33 OBSTRUCTIVE SLEEP APNEA (ADULT) (PEDIATRIC): ICD-10-CM

## 2023-04-03 DIAGNOSIS — E66.01 MORBID (SEVERE) OBESITY DUE TO EXCESS CALORIES: ICD-10-CM

## 2023-04-03 DIAGNOSIS — R06.09 OTHER FORMS OF DYSPNEA: ICD-10-CM

## 2023-04-03 PROCEDURE — 99214 OFFICE O/P EST MOD 30 MIN: CPT

## 2023-04-07 PROBLEM — G47.33 OBSTRUCTIVE SLEEP APNEA: Status: ACTIVE | Noted: 2022-11-09

## 2023-04-07 PROBLEM — E66.01 MORBID OBESITY: Status: ACTIVE | Noted: 2022-11-29

## 2023-04-07 PROBLEM — E66.01 MORBID OBESITY WITH BMI OF 40.0-44.9, ADULT: Status: ACTIVE | Noted: 2022-11-09

## 2023-04-07 PROBLEM — R06.09 DYSPNEA ON EXERTION: Status: ACTIVE | Noted: 2022-10-14

## 2023-04-07 NOTE — ASSESSMENT
[FreeTextEntry1] : 10/14/2022 Orlin Vargas is a 57-year-old white male with recent cardiac event resulting in cardiac stenting.  This was a sentinel event in his life and he is reassessing how to improve his health.  The patient realizes that he is morbidly obese and seeks to lose weight.  Exercise and regain some youthful vitality.  At this time the patient is succeeding to lose weight on his diet and now is here to evaluate his breathing.  The patient had a spirometry which was normal and his CT scan showed some air trapping with a mosaic pattern that is most likely consistent with some chronic lung disease.  Most prominently the patient has symptoms of sleep apnea.  Has been advised for him to have a sleep study and then revisit treatment most likely with APAP. All qestions form the patient and his wife were answered    time spent 45mnutes  counseling, education, documentation, imaging reviewed, medication reviewed, , old records reviewed, HX and PE \par \par 4/7/23 The patient is doing well and is looking forward to the proposed Bariatric surgery  He has no contraindication to the proposed bariatric partial gastric resection  under general anesthesia  He has no increased risk for resp complications  He has sleep apnea and appropriate measures need to be taken perioperatively  time spent 30 min counseling, education, documentation, imaging reviewed, medication reviewed, old records reviewed, HX and PE

## 2023-04-07 NOTE — REASON FOR VISIT
[Follow-Up] : a follow-up visit [Sleep Apnea] : sleep apnea [Obesity] : obesity [TextBox_44] : Medical Clearance.  Patient is having Bariatric Surgery on 4/24/23 at [facility] with Fabiola Waddell.  Patient has sleep apnea he finds the full mask very uncomfortable.  He has not been using the CPAP that much.

## 2023-04-07 NOTE — HISTORY OF PRESENT ILLNESS
[Obstructive Sleep Apnea] : obstructive sleep apnea [CPAP:] : CPAP [Full Face mask] : full face mask [TextBox_4] : 10/14/2022 Orlin Vargas is a 57-year-old male with  recent cardiac stenting The is now SOB He is known to have been a smoker but quit 36yrs ago. He is morbidly obese and has Sx of sleep apnea . His wife observes him snoring and apneic   The patient was sent for a Ct scan and has findings of " air trapping, mosaic pattern, and atelectasis of the RML"  on weight watchers \par \par 4/7/23 The patient is set for his bariatric surgery later this month He has lost some weight  He has tolerated the APAP but does not like using it   NO new Acute Sx     [TextBox_158] : [employer]

## 2023-07-20 NOTE — ASU PREOP CHECKLIST - PATIENT'S PERSONAL PROPERTY GIVEN TO
6b/on unit Erythromycin Pregnancy And Lactation Text: This medication is Pregnancy Category B and is considered safe during pregnancy. It is also excreted in breast milk.

## 2023-08-18 ENCOUNTER — APPOINTMENT (OUTPATIENT)
Dept: PULMONOLOGY | Facility: CLINIC | Age: 58
End: 2023-08-18

## 2024-12-16 ENCOUNTER — APPOINTMENT (OUTPATIENT)
Dept: ORTHOPEDIC SURGERY | Facility: CLINIC | Age: 59
End: 2024-12-16
Payer: COMMERCIAL

## 2024-12-16 VITALS — WEIGHT: 315 LBS | BODY MASS INDEX: 40.43 KG/M2 | HEIGHT: 74 IN

## 2024-12-16 DIAGNOSIS — M25.521 PAIN IN RIGHT ELBOW: ICD-10-CM

## 2024-12-16 DIAGNOSIS — M25.522 PAIN IN LEFT ELBOW: ICD-10-CM

## 2024-12-16 DIAGNOSIS — M77.01 MEDIAL EPICONDYLITIS, RIGHT ELBOW: ICD-10-CM

## 2024-12-16 DIAGNOSIS — M77.02 MEDIAL EPICONDYLITIS, RIGHT ELBOW: ICD-10-CM

## 2024-12-16 PROCEDURE — L3908: CPT | Mod: RT

## 2024-12-16 PROCEDURE — 99214 OFFICE O/P EST MOD 30 MIN: CPT

## 2024-12-16 PROCEDURE — 73080 X-RAY EXAM OF ELBOW: CPT | Mod: LT

## 2024-12-16 RX ORDER — NAPROXEN 500 MG/1
500 TABLET ORAL
Qty: 60 | Refills: 0 | Status: ACTIVE | COMMUNITY
Start: 2024-12-16 | End: 1900-01-01

## 2025-01-30 ENCOUNTER — APPOINTMENT (OUTPATIENT)
Dept: ORTHOPEDIC SURGERY | Facility: CLINIC | Age: 60
End: 2025-01-30
Payer: COMMERCIAL

## 2025-01-30 VITALS — WEIGHT: 315 LBS | BODY MASS INDEX: 40.43 KG/M2 | HEIGHT: 74 IN

## 2025-01-30 DIAGNOSIS — M77.01 MEDIAL EPICONDYLITIS, RIGHT ELBOW: ICD-10-CM

## 2025-01-30 DIAGNOSIS — M77.02 MEDIAL EPICONDYLITIS, RIGHT ELBOW: ICD-10-CM

## 2025-01-30 DIAGNOSIS — M25.522 PAIN IN LEFT ELBOW: ICD-10-CM

## 2025-01-30 DIAGNOSIS — M25.521 PAIN IN RIGHT ELBOW: ICD-10-CM

## 2025-01-30 PROCEDURE — 99213 OFFICE O/P EST LOW 20 MIN: CPT

## 2025-01-30 RX ORDER — NAPROXEN 500 MG/1
500 TABLET ORAL
Qty: 60 | Refills: 0 | Status: ACTIVE | COMMUNITY
Start: 2025-01-30 | End: 1900-01-01

## 2025-03-20 ENCOUNTER — APPOINTMENT (OUTPATIENT)
Dept: ORTHOPEDIC SURGERY | Facility: CLINIC | Age: 60
End: 2025-03-20

## 2025-07-17 ENCOUNTER — APPOINTMENT (OUTPATIENT)
Dept: UROLOGY | Facility: CLINIC | Age: 60
End: 2025-07-17
Payer: COMMERCIAL

## 2025-07-17 VITALS
SYSTOLIC BLOOD PRESSURE: 116 MMHG | BODY MASS INDEX: 36.57 KG/M2 | WEIGHT: 285 LBS | HEIGHT: 74 IN | DIASTOLIC BLOOD PRESSURE: 73 MMHG | HEART RATE: 71 BPM

## 2025-07-17 PROCEDURE — 99203 OFFICE O/P NEW LOW 30 MIN: CPT

## 2025-07-23 ENCOUNTER — APPOINTMENT (OUTPATIENT)
Dept: UROLOGY | Facility: CLINIC | Age: 60
End: 2025-07-23
Payer: COMMERCIAL

## 2025-07-23 VITALS
SYSTOLIC BLOOD PRESSURE: 102 MMHG | HEART RATE: 54 BPM | DIASTOLIC BLOOD PRESSURE: 63 MMHG | WEIGHT: 285 LBS | BODY MASS INDEX: 36.57 KG/M2 | HEIGHT: 74 IN

## 2025-07-23 DIAGNOSIS — N52.9 MALE ERECTILE DYSFUNCTION, UNSPECIFIED: ICD-10-CM

## 2025-07-23 PROCEDURE — 99214 OFFICE O/P EST MOD 30 MIN: CPT

## 2025-07-23 RX ORDER — PAPAVER/PHENTOLAMINE/ALPROSTAD 150-5-50
150-5-50 VIAL (EA) INTRACAVERNOSAL
Qty: 1 | Refills: 3 | Status: ACTIVE | COMMUNITY
Start: 2025-07-23 | End: 1900-01-01

## 2025-08-27 ENCOUNTER — APPOINTMENT (OUTPATIENT)
Dept: UROLOGY | Facility: CLINIC | Age: 60
End: 2025-08-27